# Patient Record
Sex: FEMALE | Race: WHITE | Employment: PART TIME | URBAN - METROPOLITAN AREA
[De-identification: names, ages, dates, MRNs, and addresses within clinical notes are randomized per-mention and may not be internally consistent; named-entity substitution may affect disease eponyms.]

---

## 2017-12-21 ENCOUNTER — OFFICE VISIT (OUTPATIENT)
Dept: FAMILY MEDICINE CLINIC | Age: 57
End: 2017-12-21

## 2017-12-21 VITALS
TEMPERATURE: 99.1 F | BODY MASS INDEX: 29.92 KG/M2 | HEIGHT: 65 IN | SYSTOLIC BLOOD PRESSURE: 104 MMHG | RESPIRATION RATE: 16 BRPM | HEART RATE: 84 BPM | DIASTOLIC BLOOD PRESSURE: 73 MMHG | OXYGEN SATURATION: 97 % | WEIGHT: 179.6 LBS

## 2017-12-21 DIAGNOSIS — J40 BRONCHITIS: Primary | ICD-10-CM

## 2017-12-21 RX ORDER — DOXYCYCLINE 100 MG/1
100 CAPSULE ORAL 2 TIMES DAILY
Qty: 20 CAP | Refills: 0 | Status: SHIPPED | OUTPATIENT
Start: 2017-12-21 | End: 2017-12-23 | Stop reason: ALTCHOICE

## 2017-12-21 NOTE — PROGRESS NOTES
HPI/ROS  Patient complains of bilateral ear pressure/pain. Symptoms include congestion, headache described as frontal, lightheadedness, low grade fever, post nasal drip, productive cough with  yellow colored sputum, sinus pressure, tooth pain and vertigo. Onset of symptoms was 5 days ago, gradually worsening since that time. Patient is drinking plenty of fluids. .  Past history is significant for no history of pneumonia or bronchitis. Patient is non-smoker. She is using motrin x 1 for fever 1 hour ago, tmax 100.1. Body aches have started back last night. Visit Vitals    /73 (BP 1 Location: Right arm, BP Patient Position: Sitting)    Pulse 84    Temp 99.1 °F (37.3 °C) (Oral)    Resp 16    Ht 5' 5\" (1.651 m)    Wt 179 lb 9.6 oz (81.5 kg)    SpO2 97%    BMI 29.89 kg/m2     Physical Examination:   GENERAL ASSESSMENT: well developed and well nourished  SKIN: normal color, no lesions  HEAD: normocephalic  EYES: normal eyes  EARS: external auditory canal: clear and tympanic membrane: yellow, bulging  NOSE: normal external appearance and nares patent  MOUTH: yellow exudates of OP  NECK: normal  CHEST: normal air exchange, no rales, right upper lobe rhonchi, no wheezes, respiratory effort normal with no retractions  HEART: regular rate and rhythm, normal S1/S2, no murmurs  ABDOMEN:  not examined  EXTREMITY: not examined  NEURO: not examined    Diagnoses and all orders for this visit:    1. Bronchitis    Other orders  -     doxycycline (VIBRAMYCIN) 100 mg capsule; Take 1 Cap by mouth two (2) times a day for 10 days. Reveiwed adr/se of medication  Push fluids, rest, suggested mucinexD for congestion and drainage  Recheck 5-7 days if sx not improved. I have discussed the diagnosis with the patient and the intended plan as seen in the above orders. The patient has received an after-visit summary and questions were answered concerning future plans.  Patient conveyed understanding of the plan at the time of the visit.     Malinda Frye, MSN, ANP  12/21/2017

## 2017-12-21 NOTE — MR AVS SNAPSHOT
Visit Information Date & Time Provider Department Dept. Phone Encounter #  
 12/21/2017  2:15 PM Candi Carey, [address] [phone] [id number] Upcoming Health Maintenance Date Due DTaP/Tdap/Td series (1 - Tdap) 4/28/1981 PAP AKA CERVICAL CYTOLOGY 4/28/1981 FOBT Q 1 YEAR AGE 50-75 4/28/2010 Allergies as of 12/21/2017  Review Complete On: 12/21/2017 By: Cynthia Grubbs LPN Severity Noted Reaction Type Reactions Keflex [Cephalexin]  07/09/2015    Rash Other Medication  07/09/2015    Contact Dermatitis PPD serum Current Immunizations  Reviewed on 10/30/2011 Name Date Influenza Vaccine Split 10/30/2011 Not reviewed this visit You Were Diagnosed With   
  
 Codes Comments Bronchitis    -  Primary ICD-10-CM: U70 ICD-9-CM: 004 Vitals BP Pulse Temp Resp Height(growth percentile) Weight(growth percentile) 104/73 (BP 1 Location: Right arm, BP Patient Position: Sitting) 84 99.1 °F (37.3 °C) (Oral) 16 5' 5\" (1.651 m) 179 lb 9.6 oz (81.5 kg) SpO2 BMI OB Status Smoking Status 97% 29.89 kg/m2 Hysterectomy Never Smoker Vitals History BMI and BSA Data Body Mass Index Body Surface Area  
 29.89 kg/m 2 1.93 m 2 Preferred Pharmacy Pharmacy Name Phone [facility] [place] [facility] [phone] Your Updated Medication List  
  
   
This list is accurate as of: 12/21/17  2:39 PM.  Always use your most recent med list.  
  
  
  
  
 doxycycline 100 mg capsule Commonly known as:  VIBRAMYCIN Take 1 Cap by mouth two (2) times a day for 10 days. multivitamin, tx-iron-ca-min 9 mg iron-400 mcg Tab tablet Commonly known as:  THERA-M w/ IRON Take 1 Tab by mouth daily. Prescriptions Sent to Pharmacy  Refills  
 doxycycline (VIBRAMYCIN) 100 mg capsule 0  
 Sig: Take 1 Cap by mouth two (2) times a day for 10 days. Class: Normal  
 Pharmacy: [address] [place] [address] #: [phone] Route: Oral  
  
Introducing [facility]! Dear Mikhail La: Thank you for requesting a Spool account. Our records indicate that you already have an active Spool account. You can access your account anytime at [email or web address] Did you know that you can access your hospital and ER discharge instructions at any time in Spool? You can also review all of your test results from your hospital stay or ER visit. Additional Information If you have questions, please visit the Frequently Asked Questions section of the Spool website at [email or web address] Remember, Spool is NOT to be used for urgent needs. For medical emergencies, dial 911. Now available from your iPhone and Android! Please provide this summary of care documentation to your next provider. Your primary care clinician is listed as Yo. If you have any questions after today's visit, please call [phone].

## 2017-12-21 NOTE — PROGRESS NOTES
Chief Complaint   Patient presents with    Cough     Started last Wednesday with scratchy throat and cough, fever and productive cough over the weekend, then woke up today with fever, cough and achy

## 2017-12-22 ENCOUNTER — OFFICE VISIT (OUTPATIENT)
Dept: FAMILY MEDICINE CLINIC | Age: 57
End: 2017-12-22

## 2017-12-22 VITALS
RESPIRATION RATE: 17 BRPM | TEMPERATURE: 101.3 F | DIASTOLIC BLOOD PRESSURE: 80 MMHG | HEART RATE: 90 BPM | SYSTOLIC BLOOD PRESSURE: 132 MMHG | OXYGEN SATURATION: 95 % | HEIGHT: 65 IN

## 2017-12-22 DIAGNOSIS — R05.9 COUGH: ICD-10-CM

## 2017-12-22 DIAGNOSIS — B34.9 VIRAL SYNDROME: Primary | ICD-10-CM

## 2017-12-22 LAB
QUICKVUE INFLUENZA TEST: NEGATIVE
VALID INTERNAL CONTROL?: YES

## 2017-12-22 RX ORDER — OSELTAMIVIR PHOSPHATE 75 MG/1
75 CAPSULE ORAL 2 TIMES DAILY
Qty: 10 CAP | Refills: 0 | Status: SHIPPED | OUTPATIENT
Start: 2017-12-22 | End: 2017-12-27

## 2017-12-22 NOTE — PROGRESS NOTES
Chief Complaint   Patient presents with    Fever    Generalized Body Aches   pt c/o fever and body aches. She states she has been taken her abx doxycycline as prescribed and has been taking Motrin to help with discomfort. This note will not be viewable in [address]

## 2017-12-23 ENCOUNTER — OFFICE VISIT (OUTPATIENT)
Dept: PRIMARY CARE CLINIC | Age: 57
End: 2017-12-23

## 2017-12-23 VITALS
WEIGHT: 177.4 LBS | DIASTOLIC BLOOD PRESSURE: 74 MMHG | TEMPERATURE: 99.5 F | HEART RATE: 103 BPM | BODY MASS INDEX: 29.56 KG/M2 | SYSTOLIC BLOOD PRESSURE: 124 MMHG | HEIGHT: 65 IN | RESPIRATION RATE: 18 BRPM | OXYGEN SATURATION: 98 %

## 2017-12-23 DIAGNOSIS — J02.9 SORE THROAT: ICD-10-CM

## 2017-12-23 DIAGNOSIS — J02.9 ACUTE PHARYNGITIS, UNSPECIFIED ETIOLOGY: Primary | ICD-10-CM

## 2017-12-23 LAB
MONONUCLEOSIS SCREEN POC: NEGATIVE
S PYO AG THROAT QL: NEGATIVE
VALID INTERNAL CONTROL?: YES
VALID INTERNAL CONTROL?: YES

## 2017-12-23 RX ORDER — AZITHROMYCIN 250 MG/1
TABLET, FILM COATED ORAL
Qty: 6 TAB | Refills: 0 | Status: SHIPPED | OUTPATIENT
Start: 2017-12-23 | End: 2018-03-10 | Stop reason: ALTCHOICE

## 2017-12-23 NOTE — MR AVS SNAPSHOT
Visit Information Date & Time Provider Department Dept. Phone Encounter #  
 12/23/2017 10:00 AM Gomez Umanzor Gilberto Berenice [id number] Upcoming Health Maintenance Date Due DTaP/Tdap/Td series (1 - Tdap) 4/28/1981 PAP AKA CERVICAL CYTOLOGY 4/28/1981 FOBT Q 1 YEAR AGE 50-75 4/28/2010 Allergies as of 12/23/2017  Review Complete On: 12/23/2017 By: Gomez Umanzor MD  
  
 Severity Noted Reaction Type Reactions Keflex [Cephalexin]  07/09/2015    Rash Other Medication  07/09/2015    Contact Dermatitis PPD serum Current Immunizations  Reviewed on 10/30/2011 Name Date Influenza Vaccine Split 10/30/2011 Not reviewed this visit You Were Diagnosed With   
  
 Codes Comments Acute pharyngitis, unspecified etiology    -  Primary ICD-10-CM: J02.9 ICD-9-CM: 281 Sore throat     ICD-10-CM: J02.9 ICD-9-CM: 283 Vitals BP Pulse Temp Resp Height(growth percentile) Weight(growth percentile) 124/74 (BP 1 Location: Left arm, BP Patient Position: Sitting) (!) 103 99.5 °F (37.5 °C) (Oral) 18 5' 5\" (1.651 m) 177 lb 6.4 oz (80.5 kg) SpO2 BMI OB Status Smoking Status 98% 29.52 kg/m2 Hysterectomy Never Smoker BMI and BSA Data Body Mass Index Body Surface Area  
 29.52 kg/m 2 1.92 m 2 Preferred Pharmacy Pharmacy Name Phone [address] [phone] Your Updated Medication List  
  
   
This list is accurate as of: 12/23/17 10:40 AM.  Always use your most recent med list.  
  
  
  
  
 azithromycin 250 mg tablet Commonly known as:  Bonney Fine Take 2 tablets today, then take 1 tablet daily  
  
 multivitamin, tx-iron-ca-min 9 mg iron-400 mcg Tab tablet Commonly known as:  THERA-M w/ IRON Take 1 Tab by mouth daily. oseltamivir 75 mg capsule Commonly known as:  TAMIFLU Take 1 Cap by mouth two (2) times a day for 5 days. Prescriptions Sent to Pharmacy Refills  
 azithromycin (ZITHROMAX) 250 mg tablet 0 Sig: Take 2 tablets today, then take 1 tablet daily Class: Normal  
 Pharmacy: [address] [place] [address] #: [phone] We Performed the Following AMB POC RAPID STREP A [50801 CPT(R)] POC HETEROPHILE ANTIBODY SCREEN [24359 CPT(R)] UPPER RESPIRATORY CULTURE [id number] CPT(R)] Patient Instructions Sore Throat: Care Instructions Your Care Instructions Infection by bacteria or a virus causes most sore throats. Cigarette smoke, dry air, air pollution, allergies, and yelling can also cause a sore throat. Sore throats can be painful and annoying. Fortunately, most sore throats go away on their own. If you have a bacterial infection, your doctor may prescribe antibiotics. Follow-up care is a key part of your treatment and safety. Be sure to make and go to all appointments, and call your doctor if you are having problems. It's also a good idea to know your test results and keep a list of the medicines you take. How can you care for yourself at home? · If your doctor prescribed antibiotics, take them as directed. Do not stop taking them just because you feel better. You need to take the full course of antibiotics. · Gargle with warm salt water once an hour to help reduce swelling and relieve discomfort. Use 1 teaspoon of salt mixed in 1 cup of warm water. · Take an over-the-counter pain medicine, such as acetaminophen (Tylenol), ibuprofen (Advil, Motrin), or naproxen (Aleve). Read and follow all instructions on the label. · Be careful when taking over-the-counter cold or flu medicines and Tylenol at the same time. Many of these medicines have acetaminophen, which is Tylenol. Read the labels to make sure that you are not taking more than the recommended dose. Too much acetaminophen (Tylenol) can be harmful. · Drink plenty of fluids. Fluids may help soothe an irritated throat. Hot fluids, such as tea or soup, may help decrease throat pain. · Use over-the-counter throat lozenges to soothe pain. Regular cough drops or hard candy may also help. These should not be given to young children because of the risk of choking. · Do not smoke or allow others to smoke around you. If you need help quitting, talk to your doctor about stop-smoking programs and medicines. These can increase your chances of quitting for good. · Use a vaporizer or humidifier to add moisture to your bedroom. Follow the directions for cleaning the machine. When should you call for help? Call your doctor now or seek immediate medical care if: 
? · You have new or worse trouble swallowing. ? · Your sore throat gets much worse on one side. ? Watch closely for changes in your health, and be sure to contact your doctor if you do not get better as expected. Where can you learn more? Go to http://angeles-leonor.info/. Enter [id number] in the search box to learn more about \"Sore Throat: Care Instructions. \" Current as of: May 12, 2017 Content Version: 11.4 © 9221-6231 Healthwise, AngioSlide. Care instructions adapted under license by [employer] (which disclaims liability or warranty for this information). If you have questions about a medical condition or this instruction, always ask your healthcare professional. [place] [address] any warranty or liability for your use of this information. Introducing [facility]! Dear Yvette James: Thank you for requesting a NXE account. Our records indicate that you already have an active NXE account. You can access your account anytime at [email or web address] Did you know that you can access your hospital and ER discharge instructions at any time in NXE?   You can also review all of your test results from your hospital stay or ER visit. Additional Information If you have questions, please visit the Frequently Asked Questions section of the ThoughtLeadr website at [email or web address] Remember, ThoughtLeadr is NOT to be used for urgent needs. For medical emergencies, dial 911. Now available from your iPhone and Android! Please provide this summary of care documentation to your next provider. Your primary care clinician is listed as Yo. If you have any questions after today's visit, please call [phone].

## 2017-12-23 NOTE — PROGRESS NOTES
Chief Complaint   Patient presents with    Generalized Body Aches    Sore Throat   pt c/o continued body aches and states throat is tender on L side, states she has been taking tamiflu and doxycycline as directed. last dose of motrin was at midnight. This note will not be viewable in [address]

## 2017-12-23 NOTE — PATIENT INSTRUCTIONS
Sore Throat: Care Instructions  Your Care Instructions    Infection by bacteria or a virus causes most sore throats. Cigarette smoke, dry air, air pollution, allergies, and yelling can also cause a sore throat. Sore throats can be painful and annoying. Fortunately, most sore throats go away on their own. If you have a bacterial infection, your doctor may prescribe antibiotics. Follow-up care is a key part of your treatment and safety. Be sure to make and go to all appointments, and call your doctor if you are having problems. It's also a good idea to know your test results and keep a list of the medicines you take. How can you care for yourself at home? · If your doctor prescribed antibiotics, take them as directed. Do not stop taking them just because you feel better. You need to take the full course of antibiotics. · Gargle with warm salt water once an hour to help reduce swelling and relieve discomfort. Use 1 teaspoon of salt mixed in 1 cup of warm water. · Take an over-the-counter pain medicine, such as acetaminophen (Tylenol), ibuprofen (Advil, Motrin), or naproxen (Aleve). Read and follow all instructions on the label. · Be careful when taking over-the-counter cold or flu medicines and Tylenol at the same time. Many of these medicines have acetaminophen, which is Tylenol. Read the labels to make sure that you are not taking more than the recommended dose. Too much acetaminophen (Tylenol) can be harmful. · Drink plenty of fluids. Fluids may help soothe an irritated throat. Hot fluids, such as tea or soup, may help decrease throat pain. · Use over-the-counter throat lozenges to soothe pain. Regular cough drops or hard candy may also help. These should not be given to young children because of the risk of choking. · Do not smoke or allow others to smoke around you. If you need help quitting, talk to your doctor about stop-smoking programs and medicines.  These can increase your chances of quitting for good. · Use a vaporizer or humidifier to add moisture to your bedroom. Follow the directions for cleaning the machine. When should you call for help? Call your doctor now or seek immediate medical care if:  ? · You have new or worse trouble swallowing. ? · Your sore throat gets much worse on one side. ? Watch closely for changes in your health, and be sure to contact your doctor if you do not get better as expected. Where can you learn more? Go to http://angeles-leonor.info/. Enter [id number] in the search box to learn more about \"Sore Throat: Care Instructions. \"  Current as of: May 12, 2017  Content Version: 11.4  © 1630-9162 Healthwise, Incorporated. Care instructions adapted under license by [employer] (which disclaims liability or warranty for this information). If you have questions about a medical condition or this instruction, always ask your healthcare professional. [address] any warranty or liability for your use of this information.

## 2017-12-23 NOTE — PROGRESS NOTES
HISTORY OF PRESENT ILLNESS  Jolynn Boston is a 62 y.o. female. HPI  Presents for continued symptoms of fever and fatigue. States symptoms started with sore throat since 12/14 that developed into cough and fever later that week, felt initially better but 3 days ago felt worse and was seen in clinic on 12/21, started on doxycycline for bronchitis, seen again yesterday for worsening symptoms and fever had negative flu but started on tamiflu. She is currently on doxycycline day 3. States cough is slightly better however left throat hurts today and she still feels fatigued and chills and temp 101 at home last night. She has tried flonase, mucinex, motrin last night. She has received flu vaccine. Denies sick contacts. Review of Systems   Constitutional: Positive for chills, fever and malaise/fatigue. HENT: Positive for sore throat. Negative for congestion, ear pain and sinus pain. Eyes: Negative for pain and redness. Respiratory: Positive for cough. Negative for sputum production, shortness of breath and stridor. Cardiovascular: Negative for chest pain and palpitations. Gastrointestinal: Negative for abdominal pain, diarrhea and nausea. Genitourinary: Negative for dysuria, frequency and urgency. Musculoskeletal: Negative for back pain, falls and neck pain. Skin: Negative for rash. Neurological: Negative for tingling, sensory change, focal weakness, weakness and headaches. History reviewed. No pertinent past medical history.   Past Surgical History:   Procedure Laterality Date    HX CHOLECYSTECTOMY  1997    HX GYN  2009    VICKI     Social History     Social History    Marital status:      Spouse name: N/A    Number of children: N/A    Years of education: N/A     Social History Main Topics    Smoking status: Never Smoker    Smokeless tobacco: Never Used    Alcohol use No    Drug use: None    Sexual activity: Yes     Birth control/ protection: Surgical     Other Topics Concern    None     Social History Narrative     History reviewed. No pertinent family history. Current Outpatient Prescriptions on File Prior to Visit   Medication Sig Dispense Refill    oseltamivir (TAMIFLU) 75 mg capsule Take 1 Cap by mouth two (2) times a day for 5 days. 10 Cap 0    multivitamin, tx-iron-ca-min (THERA-M W/ IRON) 9 mg iron-400 mcg tab tablet Take 1 Tab by mouth daily. No current facility-administered medications on file prior to visit. Allergies   Allergen Reactions    Keflex [Cephalexin] Rash    Other Medication Contact Dermatitis     PPD serum       Physical Exam   Constitutional: She is oriented to person, place, and time. She appears well-developed and well-nourished. No distress. /74 (BP 1 Location: Left arm, BP Patient Position: Sitting)  Pulse (!) 103  Temp 99.5 °F (37.5 °C) (Oral)   Resp 18  Ht 5' 5\" (1.651 m)  Wt 177 lb 6.4 oz (80.5 kg)  SpO2 98%  BMI 29.52 kg/m2   HENT:   Head: Normocephalic and atraumatic. Right Ear: Tympanic membrane normal.   Left Ear: Tympanic membrane normal.   Nose: Nose normal. No mucosal edema. Mouth/Throat: Oropharyngeal exudate, posterior oropharyngeal edema and posterior oropharyngeal erythema present. Eyes: Conjunctivae and EOM are normal. Pupils are equal, round, and reactive to light. No scleral icterus. Neck: Normal range of motion. Neck supple. Cardiovascular: Normal rate, regular rhythm, normal heart sounds and intact distal pulses. No murmur heard. Pulmonary/Chest: Effort normal and breath sounds normal. No stridor. No respiratory distress. She has no wheezes. Abdominal: Soft. Bowel sounds are normal. She exhibits no distension. There is no tenderness. There is no rebound and no guarding. Musculoskeletal: Normal range of motion. She exhibits no edema or tenderness. Lymphadenopathy:     She has cervical adenopathy. Neurological: She is alert and oriented to person, place, and time. No cranial nerve deficit. Skin: Skin is warm and dry. No rash noted. Psychiatric: She has a normal mood and affect. Her behavior is normal.   Nursing note and vitals reviewed. Rapid strep negative. Monospot negative. ASSESSMENT and PLAN    ICD-10-CM ICD-9-CM    1. Acute pharyngitis, unspecified etiology J02.9 462 UPPER RESPIRATORY CULTURE      azithromycin (ZITHROMAX) 250 mg tablet   2. Sore throat J02.9 462 AMB POC RAPID STREP A      POC HETEROPHILE ANTIBODY SCREEN      UPPER RESPIRATORY CULTURE     Patient currently on doxycycline for presumed bacterial bronchitis and tamiflu despite negative rapi flu yesterday, cough is better however she is still febrile (though down-trending from tomorrow) and with tonsillar erythema and exudate on exam however negative strep and monospot today. Given improvement in cough will forgo CXR for now as it won't . Again fever is slightly better but with being on doxy day 3 now would've expected more significant improvement. Will switch doxycycline to azithromycin which will provide strep coverage and send throat culture. Azithromycin will still provide coverage for bronchitis/walking PNA. Advised to continue to monitor fever curve at home, return to clinic if not improving or worsening. This note will not be viewable in [address]

## 2017-12-24 ENCOUNTER — HOSPITAL ENCOUNTER (EMERGENCY)
Age: 57
Discharge: STILL ADMITTED OR ADMINISTRATIVE | End: 2017-12-24
Attending: EMERGENCY MEDICINE
Payer: COMMERCIAL

## 2017-12-24 ENCOUNTER — HOSPITAL ENCOUNTER (EMERGENCY)
Age: 57
Discharge: HOME | End: 2017-12-24
Attending: EMERGENCY MEDICINE
Payer: COMMERCIAL

## 2017-12-24 VITALS
HEART RATE: 74 BPM | TEMPERATURE: 99.8 F | SYSTOLIC BLOOD PRESSURE: 100 MMHG | DIASTOLIC BLOOD PRESSURE: 70 MMHG | WEIGHT: 175 LBS | BODY MASS INDEX: 28.12 KG/M2 | OXYGEN SATURATION: 98 % | RESPIRATION RATE: 16 BRPM | HEIGHT: 66 IN

## 2017-12-24 DIAGNOSIS — B34.9 VIRAL SYNDROME: ICD-10-CM

## 2017-12-24 DIAGNOSIS — R50.9 FEVER, UNSPECIFIED FEVER CAUSE: Primary | ICD-10-CM

## 2017-12-24 LAB
ANION GAP SERPL CALC-SCNC: 11 MMOL/L (ref 5–15)
BASOPHILS # BLD: 0 K/UL (ref 0–0.1)
BASOPHILS NFR BLD: 0 % (ref 0–1)
BUN SERPL-MCNC: 13 MG/DL (ref 6–20)
BUN/CREAT SERPL: 16 (ref 12–20)
CALCIUM SERPL-MCNC: 8.7 MG/DL (ref 8.5–10.1)
CHLORIDE SERPL-SCNC: 101 MMOL/L (ref 97–108)
CO2 SERPL-SCNC: 26 MMOL/L (ref 21–32)
CREAT SERPL-MCNC: 0.8 MG/DL (ref 0.55–1.02)
EOSINOPHIL # BLD: 0 K/UL (ref 0–0.4)
EOSINOPHIL NFR BLD: 1 % (ref 0–7)
ERYTHROCYTE [DISTWIDTH] IN BLOOD BY AUTOMATED COUNT: 13.4 % (ref 11.5–14.5)
GLUCOSE SERPL-MCNC: 99 MG/DL (ref 65–100)
HCT VFR BLD AUTO: 34 % (ref 35–47)
HGB BLD-MCNC: 11.5 G/DL (ref 11.5–16)
LACTATE SERPL-SCNC: 0.6 MMOL/L (ref 0.4–2)
LYMPHOCYTES # BLD: 1 K/UL (ref 0.8–3.5)
LYMPHOCYTES NFR BLD: 12 % (ref 12–49)
MCH RBC QN AUTO: 28 PG (ref 26–34)
MCHC RBC AUTO-ENTMCNC: 33.8 G/DL (ref 30–36.5)
MCV RBC AUTO: 82.7 FL (ref 80–99)
MONOCYTES # BLD: 0.6 K/UL (ref 0–1)
MONOCYTES NFR BLD: 7 % (ref 5–13)
NEUTS SEG # BLD: 6.3 K/UL (ref 1.8–8)
NEUTS SEG NFR BLD: 80 % (ref 32–75)
PLATELET # BLD AUTO: 248 K/UL (ref 150–400)
POTASSIUM SERPL-SCNC: 3.3 MMOL/L (ref 3.5–5.1)
RBC # BLD AUTO: 4.11 M/UL (ref 3.8–5.2)
SODIUM SERPL-SCNC: 138 MMOL/L (ref 136–145)
WBC # BLD AUTO: 7.9 K/UL (ref 3.6–11)

## 2017-12-24 PROCEDURE — 36415 COLL VENOUS BLD VENIPUNCTURE: CPT | Performed by: EMERGENCY MEDICINE

## 2017-12-24 PROCEDURE — 99283 EMERGENCY DEPT VISIT LOW MDM: CPT

## 2017-12-24 PROCEDURE — 80048 BASIC METABOLIC PNL TOTAL CA: CPT | Performed by: EMERGENCY MEDICINE

## 2017-12-24 PROCEDURE — 85025 COMPLETE CBC W/AUTO DIFF WBC: CPT | Performed by: EMERGENCY MEDICINE

## 2017-12-24 PROCEDURE — 75810000275 HC EMERGENCY DEPT VISIT NO LEVEL OF CARE

## 2017-12-24 PROCEDURE — 74011250637 HC RX REV CODE- 250/637: Performed by: EMERGENCY MEDICINE

## 2017-12-24 PROCEDURE — 83605 ASSAY OF LACTIC ACID: CPT | Performed by: EMERGENCY MEDICINE

## 2017-12-24 RX ORDER — IBUPROFEN 600 MG/1
600 TABLET ORAL
Qty: 30 TAB | Refills: 0 | Status: SHIPPED | OUTPATIENT
Start: 2017-12-24 | End: 2018-03-10 | Stop reason: ALTCHOICE

## 2017-12-24 RX ORDER — ACETAMINOPHEN 500 MG
1000 TABLET ORAL
Qty: 60 TAB | Refills: 0 | Status: SHIPPED | OUTPATIENT
Start: 2017-12-24 | End: 2018-03-10 | Stop reason: ALTCHOICE

## 2017-12-24 RX ORDER — ACETAMINOPHEN 500 MG
1000 TABLET ORAL
Status: COMPLETED | OUTPATIENT
Start: 2017-12-24 | End: 2017-12-24

## 2017-12-24 RX ADMIN — ACETAMINOPHEN 1000 MG: 500 TABLET ORAL at 20:12

## 2017-12-25 NOTE — DISCHARGE INSTRUCTIONS
Learning About Fever  What is a fever? A fever is a high body temperature. It's one way your body fights being sick. A fever shows that the body is responding to infection or other illnesses, both minor and severe. A fever is a symptom, not an illness by itself. A fever can be a sign that you are ill, but most fevers are not caused by a serious problem. You may have a fever with a minor illness, such as a cold. But sometimes a very serious infection may cause little or no fever. It is important to look at other symptoms, other conditions you have, and how you feel in general. In children, notice how they act and see what symptoms they complain of. What is a normal body temperature? A normal body temperature is about 98. 6ºF. Some people have a normal temperature that is a little higher or a little lower than this. Your temperature may be a little lower in the morning than it is later in the day. It may go up during hot weather or when you exercise, wear heavy clothes, or take a hot bath. Your temperature may also be different depending on how you take it. A temperature taken in the mouth (oral) or under the arm may be a little lower than your core temperature (rectal). What is a fever temperature? A core temperature of 100.4°F or above is considered a fever. What can cause a fever? A fever may be caused by:  · Infections. This is the most common cause of a fever. Examples of infections that can cause a fever include the flu, a kidney infection, or pneumonia. · Some medicines. · Severe trauma or injury, such as a heart attack, stroke, heatstroke, or burns. · Other medical conditions, such as arthritis and some cancers. How can you treat a fever at home? · Ask your doctor if you can take an over-the-counter pain medicine, such as acetaminophen (Tylenol), ibuprofen (Advil, Motrin), or naproxen (Aleve). Be safe with medicines. Read and follow all instructions on the label.   · To prevent dehydration, drink plenty of fluids. Choose water and other caffeine-free clear liquids until you feel better. If you have kidney, heart, or liver disease and have to limit fluids, talk with your doctor before you increase the amount of fluids you drink. Follow-up care is a key part of your treatment and safety. Be sure to make and go to all appointments, and call your doctor if you are having problems. It's also a good idea to know your test results and keep a list of the medicines you take. Where can you learn more? Go to http://angeles-leonor.info/. Enter V198 in the search box to learn more about \"Learning About Fever. \"  Current as of: March 20, 2017  Content Version: 11.4  © 6610-7594 Healthwise, Incorporated. Care instructions adapted under license by uberlife (which disclaims liability or warranty for this information). If you have questions about a medical condition or this instruction, always ask your healthcare professional. [address] any warranty or liability for your use of this information.

## 2017-12-25 NOTE — ED TRIAGE NOTES
Pt reports persistent fever since 12/21. Given doxycycline at that time for possible bronchitis. Flu negative on 12/22 and strep and mono were negative 12/23. Here for continued fever.

## 2017-12-25 NOTE — ED PROVIDER NOTES
HPI Comments: 62 y.o. female with no significant past medical history who presents from home with chief complaint of fever. Patient states that she had a low grade fever with a sore throat and cough on 12/13 (11 days ago). This past Thursday (3 days ago), patient started to have onset of body aches and an increased, persistent fever of 103F. She was seen by the [address] [place] [address] and received doxycycline after the nurse practitioner heard some rhonchi in her lungs. Patient continued to have a high fever and body aches and went back to [facility], and received negative strep test, mono test, an flu test. However, patient was still placed on Tamiflu due to her flu-like symptoms, and was also changed to azithromycin from her doxycycline. Patient has been taking her azithromycin for 2 days now. She reports having an oral temp of 104.2F approximately 2 hours ago. Patient reports taking 200 mg Motrin around that time. She denies having any nausea, vomiting, diarrhea, abdominal pain, or sore throat. There are no other acute medical concerns at this time. Social hx: nonsmoker, no EtOH use   PCP: Karli Alberto MD    Note written by Tamera Mejía, as dictated by Tyrone Senior. Berna Lemus MD 8:01 PM      The history is provided by the patient. No  was used. History reviewed. No pertinent past medical history. Past Surgical History:   Procedure Laterality Date    HX CHOLECYSTECTOMY  1997    HX GYN  2009    VICKI         History reviewed. No pertinent family history. Social History     Social History    Marital status:      Spouse name: N/A    Number of children: N/A    Years of education: N/A     Occupational History    Not on file.      Social History Main Topics    Smoking status: Never Smoker    Smokeless tobacco: Never Used    Alcohol use No    Drug use: Not on file    Sexual activity: Yes     Birth control/ protection: Surgical     Other Topics Concern    Not on file     Social History Narrative         ALLERGIES: Keflex [cephalexin] and Other medication    Review of Systems   Constitutional: Positive for fever. Negative for chills. HENT: Negative for ear pain and sore throat. Eyes: Negative for pain. Respiratory: Negative for chest tightness and shortness of breath. Cardiovascular: Negative for chest pain and leg swelling. Gastrointestinal: Negative for abdominal pain, nausea and vomiting. Genitourinary: Negative for dysuria and flank pain. Musculoskeletal: Positive for myalgias. Negative for back pain. Skin: Negative for rash. Neurological: Negative for headaches. All other systems reviewed and are negative. Vitals:    12/24/17 1937   BP: 121/77   Pulse: 100   Resp: 18   Temp: (!) 102 °F (38.9 °C)   SpO2: 95%   Weight: 79.4 kg (175 lb)   Height: 5' 6\" (1.676 m)            Physical Exam   Constitutional: She appears well-developed and well-nourished. HENT:   Head: Normocephalic and atraumatic. Mouth/Throat: Oropharynx is clear and moist.   Eyes: Conjunctivae and EOM are normal. Pupils are equal, round, and reactive to light. No scleral icterus. Neck: Neck supple. No tracheal deviation present. Cardiovascular: Normal rate, regular rhythm, normal heart sounds and intact distal pulses. Pulmonary/Chest: Effort normal and breath sounds normal. No respiratory distress. Abdominal: Soft. She exhibits no distension. There is no tenderness. There is no rebound and no guarding. Genitourinary:   Genitourinary Comments: deferred   Musculoskeletal: She exhibits no edema. Neurological: She is alert. Skin: Skin is warm. clammy   Psychiatric: She has a normal mood and affect. Nursing note and vitals reviewed. Note written by Tamera Presley, as dictated by Sujatha Hernandez.  Maverick Seaman MD 8:01 PM    MDM  Number of Diagnoses or Management Options  Fever, unspecified fever cause:   Viral syndrome:   Diagnosis management comments: 61 yo female p/w fever for past few days. On tamiflu and azithromycin . Fever has been persistent but has only been taking 200 mg of Motrin.     Plan: ibuprofen/ tylenol prn, f/u with PCP, Return to the emergency department for new/ worsening symptoms or other concerns           ED Course       Procedures

## 2017-12-26 ENCOUNTER — PATIENT OUTREACH (OUTPATIENT)
Dept: OTHER | Age: 57
End: 2017-12-26

## 2017-12-26 NOTE — PROGRESS NOTES
Transition Of Care Note    Patient discharged from [facility] on  for Fever. Medical History:   No past medical history on file. Care Manager contacted the patient by telephone to perform post ED discharge assessment. Verified  and zip code with patient as identifiers. Provided introduction to self, and explanation of the Nurse Care Manager role. Medication:   Discussed medication prescribed at [facility] and ED, and patient verbalizes understanding of administration of home medications. There were no barriers to obtaining medications identified at this time. Support system:  Patient        Discharge Instructions :  Reviewed discharge instructions with patient. Patient verbalizes understanding of discharge instructions and follow-up care. Advance Care Planning:   Patient was offered the opportunity to discuss advance care planning:  no     Does patient have an Advance Directive:  yes   If no, did you provide information on Caring Connections?  no     PCP/Specialist follow up: Patient scheduled to follow up with Ken Martin MD as needed. Reviewed red flags with patient, and patient verbalizes understanding. Patient given an opportunity to ask questions. No other clinical/social/functional needs noted. The patient agrees to contact the PCP office for questions related to their healthcare. The patient expressed thanks, offered no additional questions and ended the call.

## 2017-12-27 LAB — BACTERIA SPEC RESP CULT: NORMAL

## 2018-01-15 ENCOUNTER — PATIENT OUTREACH (OUTPATIENT)
Dept: OTHER | Age: 58
End: 2018-01-15

## 2018-01-15 NOTE — PROGRESS NOTES
Telephonic outreach to patient to follow up following a visit to the ED with high fever. Patient verified  and zip code. Patient stated that she is feeling better, her fever continued for 9 days following her visit to ED, however it was not as elevated. Patient stated that she will follow up with her PCP as needed. Will continue outreach to patient.

## 2018-01-21 NOTE — PROGRESS NOTES
HISTORY OF PRESENT ILLNESS  Elizabeth Lezama is a 62 y.o. female. HPI   This lady returns to the clinic in follow up. She was seen 12/21/17 and diagnosed with bronchitis and started on doxycycline. She returns today c/o she still has had some fever, generalized myalgia and chills. Has mild non productive cough  Eating and drinking OK    Review of Systems   Constitutional: Positive for chills and fever. Negative for malaise/fatigue and weight loss. HENT: Positive for congestion and sore throat. Respiratory: Positive for cough. Negative for sputum production and shortness of breath. Cardiovascular: Negative for chest pain. Musculoskeletal: Positive for myalgias. Physical Exam   Constitutional: She appears well-developed and well-nourished. No distress. HENT:   Right Ear: External ear normal.   Left Ear: External ear normal.   Nose: Nose normal.   Mouth/Throat: Oropharynx is clear and moist. No oropharyngeal exudate. Eyes: Pupils are equal, round, and reactive to light. Neck: Normal range of motion. Neck supple. Cardiovascular: Normal rate, regular rhythm and normal heart sounds. No murmur heard. Pulmonary/Chest: Effort normal and breath sounds normal. No respiratory distress. She has no wheezes. She has no rales. Abdominal: She exhibits no distension. Skin: She is not diaphoretic. ASSESSMENT and PLAN    ICD-10-CM ICD-9-CM    1. Viral syndrome B34.9 079.99    2. Cough R05 786.2 AMB POC RAPID INFLUENZA TEST   influenza activity rampant in the area. Will treat for presumed influenza. I have cautioned very short term follow up- withing the next 24 hours or sooner to the ed if worse  Pt verbalizes understanding.   Precautions given

## 2018-02-08 ENCOUNTER — PATIENT OUTREACH (OUTPATIENT)
Dept: OTHER | Age: 58
End: 2018-02-08

## 2018-02-08 NOTE — PROGRESS NOTES
Resolving current episode LANI (Transitions of care complete). No further ED/UC or hospital admissions within 30 days post discharge. Patient attended follow-up appointments as directed. No outreach from patient to [address] [place] [address]. Final call placed to patient, declines the need for further ECM at this time.

## 2018-03-10 ENCOUNTER — OFFICE VISIT (OUTPATIENT)
Dept: PRIMARY CARE CLINIC | Age: 58
End: 2018-03-10

## 2018-03-10 VITALS
HEIGHT: 66 IN | TEMPERATURE: 98.7 F | SYSTOLIC BLOOD PRESSURE: 107 MMHG | WEIGHT: 186.6 LBS | OXYGEN SATURATION: 96 % | DIASTOLIC BLOOD PRESSURE: 75 MMHG | BODY MASS INDEX: 29.99 KG/M2 | RESPIRATION RATE: 17 BRPM | HEART RATE: 83 BPM

## 2018-03-10 DIAGNOSIS — K52.9 GASTROENTERITIS: Primary | ICD-10-CM

## 2018-03-10 DIAGNOSIS — R50.9 FEVER, UNSPECIFIED FEVER CAUSE: ICD-10-CM

## 2018-03-10 LAB
BILIRUB UR QL STRIP: NEGATIVE
GLUCOSE UR-MCNC: NEGATIVE MG/DL
KETONES P FAST UR STRIP-MCNC: NEGATIVE MG/DL
PH UR STRIP: 7 [PH] (ref 4.6–8)
PROT UR QL STRIP: NEGATIVE
SP GR UR STRIP: 1.01 (ref 1–1.03)
UA UROBILINOGEN AMB POC: NORMAL (ref 0.2–1)
URINALYSIS CLARITY POC: CLEAR
URINALYSIS COLOR POC: NORMAL
URINE BLOOD POC: NEGATIVE
URINE LEUKOCYTES POC: NEGATIVE
URINE NITRITES POC: NEGATIVE

## 2018-03-10 RX ORDER — DOXYCYCLINE 100 MG/1
CAPSULE ORAL
Refills: 0 | COMMUNITY
Start: 2017-12-21 | End: 2018-03-10 | Stop reason: ALTCHOICE

## 2018-03-10 NOTE — PATIENT INSTRUCTIONS
Gastroenteritis: Care Instructions  Your Care Instructions    Gastroenteritis is an illness that may cause nausea, vomiting, and diarrhea. It is sometimes called \"stomach flu. \" It can be caused by bacteria or a virus. You will probably begin to feel better in 1 to 2 days. In the meantime, get plenty of rest and make sure you do not become dehydrated. Dehydration occurs when your body loses too much fluid. Follow-up care is a key part of your treatment and safety. Be sure to make and go to all appointments, and call your doctor if you are having problems. It's also a good idea to know your test results and keep a list of the medicines you take. How can you care for yourself at home? · If your doctor prescribed antibiotics, take them as directed. Do not stop taking them just because you feel better. You need to take the full course of antibiotics. · Drink plenty of fluids to prevent dehydration, enough so that your urine is light yellow or clear like water. Choose water and other caffeine-free clear liquids until you feel better. If you have kidney, heart, or liver disease and have to limit fluids, talk with your doctor before you increase your fluid intake. · Drink fluids slowly, in frequent, small amounts, because drinking too much too fast can cause vomiting. · Begin eating mild foods, such as dry toast, yogurt, applesauce, bananas, and rice. Avoid spicy, hot, or high-fat foods, and do not drink alcohol or caffeine for a day or two. Do not drink milk or eat ice cream until you are feeling better. How to prevent gastroenteritis  · Keep hot foods hot and cold foods cold. · Do not eat meats, dressings, salads, or other foods that have been kept at room temperature for more than 2 hours. · Use a thermometer to check your refrigerator. It should be between 34°F and 40°F.  · Defrost meats in the refrigerator or microwave, not on the kitchen counter. · Keep your hands and your kitchen clean.  Wash your hands, cutting boards, and countertops with hot soapy water frequently. · Cook meat until it is well done. · Do not eat raw eggs or uncooked sauces made with raw eggs. · Do not take chances. If food looks or tastes spoiled, throw it out. When should you call for help? Call 911 anytime you think you may need emergency care. For example, call if:  ? · You vomit blood or what looks like coffee grounds. ? · You passed out (lost consciousness). ? · You pass maroon or very bloody stools. ?Call your doctor now or seek immediate medical care if:  ? · You have severe belly pain. ? · You have signs of needing more fluids. You have sunken eyes, a dry mouth, and pass only a little dark urine. ? · You feel like you are going to faint. ? · You have increased belly pain that does not go away in 1 to 2 days. ? · You have new or increased nausea, or you are vomiting. ? · You have a new or higher fever. ? · Your stools are black and tarlike or have streaks of blood. ? Watch closely for changes in your health, and be sure to contact your doctor if:  ? · You are dizzy or lightheaded. ? · You urinate less than usual, or your urine is dark yellow or brown. ? · You do not feel better with each day that goes by. Where can you learn more? Go to http://angeles-leonor.info/. Enter N142 in the search box to learn more about \"Gastroenteritis: Care Instructions. \"  Current as of: March 3, 2017  Content Version: 11.4  © 7460-1328 [employer]. Care instructions adapted under license by [employer] (which disclaims liability or warranty for this information). If you have questions about a medical condition or this instruction, always ask your healthcare professional. [address] any warranty or liability for your use of this information.

## 2018-03-10 NOTE — PROGRESS NOTES
Pt here today c/o fever last night tmax 101.4, pt also c/o abdominal pressure that she noticed this morning,denies any urinary symptoms. This note will not be viewable in [address]

## 2018-03-10 NOTE — MR AVS SNAPSHOT
[address] 
[phone] Patient: Aden Wallace MRN: [id number] :1960 Visit Information Date & Time Provider Department Dept. Phone Encounter #  
 3/10/2018  9:45 AM Sandra Baires, [facility] [address] [facility] [id number] Follow-up Instructions Return if symptoms worsen or fail to improve. Upcoming Health Maintenance Date Due DTaP/Tdap/Td series (1 - Tdap) 1981 PAP AKA CERVICAL CYTOLOGY 1981 BREAST CANCER SCRN MAMMOGRAM 2010 FOBT Q 1 YEAR AGE 50-75 2010 Allergies as of 3/10/2018  Review Complete On: 3/10/2018 By: Frances Bowers LPN Severity Noted Reaction Type Reactions Keflex [Cephalexin]  2015    Rash Other Medication  2015    Contact Dermatitis PPD serum Current Immunizations  Reviewed on 10/30/2011 Name Date Influenza Vaccine Split 10/30/2011 Not reviewed this visit You Were Diagnosed With   
  
 Codes Comments Gastroenteritis    -  Primary ICD-10-CM: K52.9 ICD-9-CM: 558. 9 Fever, unspecified fever cause     ICD-10-CM: R50.9 ICD-9-CM: 780.60 Vitals BP Pulse Temp Resp Height(growth percentile) Weight(growth percentile) 107/75 (BP 1 Location: Left arm, BP Patient Position: Sitting) 83 98.7 °F (37.1 °C) (Oral) 17 5' 6\" (1.676 m) 186 lb 9.6 oz (84.6 kg) SpO2 BMI OB Status Smoking Status 96% 30.12 kg/m2 Hysterectomy Never Smoker BMI and BSA Data Body Mass Index Body Surface Area  
 30.12 kg/m 2 1.98 m 2 Preferred Pharmacy Pharmacy Name Phone [facility] [place] [phone] Your Updated Medication List  
  
   
This list is accurate as of 3/10/18 10:20 AM.  Always use your most recent med list.  
  
  
  
  
 multivitamin, tx-iron-ca-min 9 mg iron-400 mcg Tab tablet Commonly known as:  THERA-M w/ IRON Take 1 Tab by mouth daily. Follow-up Instructions Return if symptoms worsen or fail to improve. Patient Instructions Gastroenteritis: Care Instructions Your Care Instructions Gastroenteritis is an illness that may cause nausea, vomiting, and diarrhea. It is sometimes called \"stomach flu. \" It can be caused by bacteria or a virus. You will probably begin to feel better in 1 to 2 days. In the meantime, get plenty of rest and make sure you do not become dehydrated. Dehydration occurs when your body loses too much fluid. Follow-up care is a key part of your treatment and safety. Be sure to make and go to all appointments, and call your doctor if you are having problems. It's also a good idea to know your test results and keep a list of the medicines you take. How can you care for yourself at home? · If your doctor prescribed antibiotics, take them as directed. Do not stop taking them just because you feel better. You need to take the full course of antibiotics. · Drink plenty of fluids to prevent dehydration, enough so that your urine is light yellow or clear like water. Choose water and other caffeine-free clear liquids until you feel better. If you have kidney, heart, or liver disease and have to limit fluids, talk with your doctor before you increase your fluid intake. · Drink fluids slowly, in frequent, small amounts, because drinking too much too fast can cause vomiting. · Begin eating mild foods, such as dry toast, yogurt, applesauce, bananas, and rice. Avoid spicy, hot, or high-fat foods, and do not drink alcohol or caffeine for a day or two. Do not drink milk or eat ice cream until you are feeling better. How to prevent gastroenteritis · Keep hot foods hot and cold foods cold. · Do not eat meats, dressings, salads, or other foods that have been kept at room temperature for more than 2 hours. · Use a thermometer to check your refrigerator. It should be between 34°F and 40°F. 
· Defrost meats in the refrigerator or microwave, not on the kitchen counter. · Keep your hands and your kitchen clean. Wash your hands, cutting boards, and countertops with hot soapy water frequently. · Cook meat until it is well done. · Do not eat raw eggs or uncooked sauces made with raw eggs. · Do not take chances. If food looks or tastes spoiled, throw it out. When should you call for help? Call 911 anytime you think you may need emergency care. For example, call if: 
? · You vomit blood or what looks like coffee grounds. ? · You passed out (lost consciousness). ? · You pass maroon or very bloody stools. ?Call your doctor now or seek immediate medical care if: 
? · You have severe belly pain. ? · You have signs of needing more fluids. You have sunken eyes, a dry mouth, and pass only a little dark urine. ? · You feel like you are going to faint. ? · You have increased belly pain that does not go away in 1 to 2 days. ? · You have new or increased nausea, or you are vomiting. ? · You have a new or higher fever. ? · Your stools are black and tarlike or have streaks of blood. ? Watch closely for changes in your health, and be sure to contact your doctor if: 
? · You are dizzy or lightheaded. ? · You urinate less than usual, or your urine is dark yellow or brown. ? · You do not feel better with each day that goes by. Where can you learn more? Go to http://angeles-leonor.info/. Enter N142 in the search box to learn more about \"Gastroenteritis: Care Instructions. \" Current as of: March 3, 2017 Content Version: 11.4 © 4794-3001 [employer]. Care instructions adapted under license by [employer] (which disclaims liability or warranty for this information).  If you have questions about a medical condition or this instruction, always ask your healthcare professional. [address] any warranty or liability for your use of this information. Introducing [address]! Dear Duane Flicker: Thank you for requesting a PeeplePass account. Our records indicate that you already have an active PeeplePass account. You can access your account anytime at [email or web address] Did you know that you can access your hospital and ER discharge instructions at any time in PeeplePass? You can also review all of your test results from your hospital stay or ER visit. Additional Information If you have questions, please visit the Frequently Asked Questions section of the PeeplePass website at [email or web address] Remember, PeeplePass is NOT to be used for urgent needs. For medical emergencies, dial 911. Now available from your iPhone and Android! Please provide this summary of care documentation to your next provider. Your primary care clinician is listed as Yo. If you have any questions after today's visit, please call [phone].

## 2018-03-10 NOTE — PROGRESS NOTES
Subjective:      Patient : This patient is a 62 y.o. female with chief complaint of mild lower abdominal discomfort and fever of 101.4 last night. No fever this am. She was very concerned about having diverticulitis as she had 1 episode 3 years ago. The symptoms began several hours ago and have been unchanged. The symptoms were gradual  in onset. The patient states the stools have been soft. The stools are brown  The patient had denied vomitting and diarrhea. The patient has complaint of abdominal pain, The pain is described as  Very mild suprapubic pressure, located across the suprapubic area . She has not had change in diet. The patient has not tried OTCs for relief of nausea or diarrhea at home for his symptoms without relief. She rates her symtoms as mild. Objective:     ROS:   Feeling well. No dyspnea or chest pain on exertion. No abdominal pain, change in bowel habits, black or bloody stools. No urinary tract symptoms. GYN ROS: no vaginal bleeding, no discharge or pelvic pain. No neurological complaints. OBJECTIVE:   The patient appears well, alert, oriented x 3, in no distress. Visit Vitals    /75 (BP 1 Location: Left arm, BP Patient Position: Sitting)    Pulse 83    Temp 98.7 °F (37.1 °C) (Oral)    Resp 17    Ht 5' 6\" (1.676 m)    Wt 186 lb 9.6 oz (84.6 kg)    SpO2 96%    BMI 30.12 kg/m2     HEENT:ENT normal.  Neck supple. No adenopathy or thyromegaly. RAVI. Chest: Lungs are clear, good air entry, no wheezes, rhonchi or rales. Cardiovascular: S1 and S2 normal, no murmurs, regular rate and rhythm. Abdomen: soft without tenderness, guarding, mass or organomegaly. Unable to cause discomfort with exam and palpation. Extremities: show no edema, normal peripheral pulses. Neurological: is normal, no focal findings. Urine negative for all components. Assessment/Plan:       ICD-10-CM ICD-9-CM    1. Gastroenteritis K52.9 558.9    2.  Fever, unspecified fever cause R50.9 780.60 .Reviewed instructions given on diet, fluids and need for follow up if symptoms continue or worsen.

## 2018-05-24 ENCOUNTER — HOSPITAL ENCOUNTER (OUTPATIENT)
Dept: GENERAL RADIOLOGY | Age: 58
Discharge: HOME | End: 2018-05-24
Attending: INTERNAL MEDICINE
Payer: COMMERCIAL

## 2018-05-24 DIAGNOSIS — R10.32 LLQ ABDOMINAL PAIN: ICD-10-CM

## 2018-05-24 PROCEDURE — 77030032790 HC SOL CNTRST ORL POLBAR

## 2018-05-24 PROCEDURE — 74270 X-RAY XM COLON 1CNTRST STD: CPT

## 2022-03-28 ENCOUNTER — OFFICE VISIT (OUTPATIENT)
Dept: PRIMARY CARE CLINIC | Age: 62
End: 2022-03-28
Payer: COMMERCIAL

## 2022-03-28 VITALS
SYSTOLIC BLOOD PRESSURE: 118 MMHG | TEMPERATURE: 97.3 F | HEIGHT: 65 IN | DIASTOLIC BLOOD PRESSURE: 81 MMHG | WEIGHT: 185.6 LBS | HEART RATE: 103 BPM | RESPIRATION RATE: 18 BRPM | OXYGEN SATURATION: 98 % | BODY MASS INDEX: 30.92 KG/M2

## 2022-03-28 DIAGNOSIS — R30.0 DYSURIA: ICD-10-CM

## 2022-03-28 DIAGNOSIS — N30.91 CYSTITIS WITH HEMATURIA: Primary | ICD-10-CM

## 2022-03-28 LAB
BILIRUB UR QL STRIP: NEGATIVE
GLUCOSE UR-MCNC: NEGATIVE MG/DL
KETONES P FAST UR STRIP-MCNC: NEGATIVE MG/DL
PH UR STRIP: 6 [PH] (ref 4.6–8)
PROT UR QL STRIP: NORMAL
SP GR UR STRIP: 1.01 (ref 1–1.03)
UA UROBILINOGEN AMB POC: NORMAL (ref 0.2–1)
URINALYSIS CLARITY POC: NORMAL
URINALYSIS COLOR POC: YELLOW
URINE BLOOD POC: NORMAL
URINE LEUKOCYTES POC: NORMAL
URINE NITRITES POC: NEGATIVE

## 2022-03-28 PROCEDURE — 99213 OFFICE O/P EST LOW 20 MIN: CPT | Performed by: NURSE PRACTITIONER

## 2022-03-28 PROCEDURE — 81003 URINALYSIS AUTO W/O SCOPE: CPT | Performed by: NURSE PRACTITIONER

## 2022-03-28 RX ORDER — FEXOFENADINE HCL 60 MG
TABLET ORAL
COMMUNITY

## 2022-03-28 RX ORDER — BISMUTH SUBSALICYLATE 262 MG
1 TABLET,CHEWABLE ORAL DAILY
COMMUNITY

## 2022-03-28 RX ORDER — NITROFURANTOIN 25; 75 MG/1; MG/1
100 CAPSULE ORAL 2 TIMES DAILY
Qty: 10 CAPSULE | Refills: 0 | Status: SHIPPED | OUTPATIENT
Start: 2022-03-28 | End: 2022-04-02

## 2022-03-28 RX ORDER — MOMETASONE FUROATE 1 MG/G
CREAM TOPICAL
COMMUNITY
Start: 2021-05-11

## 2022-03-28 RX ORDER — PHENAZOPYRIDINE HYDROCHLORIDE 100 MG/1
100 TABLET, FILM COATED ORAL
Qty: 9 TABLET | Refills: 0 | Status: SHIPPED | OUTPATIENT
Start: 2022-03-28 | End: 2022-03-31

## 2022-03-28 NOTE — PROGRESS NOTES
HISTORY OF PRESENT ILLNESS  Jessi Garnett is a 64 y.o. female. Patient with a 1-2 hour history of frequency, urgency and hematuria. 5 pm. Fequency and saw blood. + pelvic pressure. No vaginal discharge or bleeding. N0 flank pain Hysterectomy several years ago. Past Medical History:   Diagnosis Date    Diverticulitis      Past Surgical History:   Procedure Laterality Date    HX CHOLECYSTECTOMY  0    HX GYN  2009    VICKI       Review of Systems   Constitutional: Negative for fever and malaise/fatigue. Gastrointestinal: Negative for abdominal pain, nausea and vomiting. Genitourinary: Positive for dysuria, frequency, hematuria and urgency. Negative for flank pain. Musculoskeletal: Negative for back pain and myalgias. Physical Exam  Vitals and nursing note reviewed. Constitutional:       General: She is not in acute distress. Appearance: Normal appearance. HENT:      Head: Atraumatic. Eyes:      Pupils: Pupils are equal, round, and reactive to light. Cardiovascular:      Rate and Rhythm: Normal rate. Pulses: Normal pulses. Pulmonary:      Effort: Pulmonary effort is normal.   Abdominal:      Tenderness: There is no right CVA tenderness or left CVA tenderness. Musculoskeletal:         General: Normal range of motion. Cervical back: Normal range of motion. Skin:     General: Skin is warm. Neurological:      General: No focal deficit present. Mental Status: She is alert.    Psychiatric:         Mood and Affect: Mood normal.       Results for orders placed or performed in visit on 03/28/22   AMB POC URINALYSIS DIP STICK AUTO W/O MICRO   Result Value Ref Range    Color (UA POC) Yellow     Clarity (UA POC) Cloudy     Glucose (UA POC) Negative Negative    Bilirubin (UA POC) Negative Negative    Ketones (UA POC) Negative Negative    Specific gravity (UA POC) 1.010 1.001 - 1.035    Blood (UA POC) 3+ Negative    pH (UA POC) 6.0 4.6 - 8.0    Protein (UA POC) 2+ Negative Urobilinogen (UA POC) 0.2 mg/dL 0.2 - 1    Nitrites (UA POC) Negative Negative    Leukocyte esterase (UA POC) 3+ Negative       ASSESSMENT and PLAN    ICD-10-CM ICD-9-CM    1. Cystitis with hematuria  N30.91 595.9    2. Dysuria  R30.0 788.1 AMB POC URINALYSIS DIP STICK AUTO W/O MICRO      CULTURE, URINE     Encounter Diagnoses   Name Primary?  Cystitis with hematuria Yes    Dysuria      Orders Placed This Encounter    CULTURE, URINE    AMB POC URINALYSIS DIP STICK AUTO W/O MICRO    nitrofurantoin, macrocrystal-monohydrate, (MACROBID) 100 mg capsule    phenazopyridine (PYRIDIUM) 100 mg tablet     Medications as directed  Take with food. Complete entire course of prescription. Follow plan of care as discussed. Urine culture sent. Will result in My Chart.   Signed By: Alcario Cooks, FNP     March 28, 2022

## 2022-03-28 NOTE — PATIENT INSTRUCTIONS
Urinary Tract Infection (UTI) in Women: Care Instructions  Overview     A urinary tract infection, or UTI, is a general term for an infection anywhere between the kidneys and the urethra (where urine comes out). Most UTIs are bladder infections. They often cause pain or burning when you urinate. UTIs are caused by bacteria and can be cured with antibiotics. Be sure to complete your treatment so that the infection does not get worse. Follow-up care is a key part of your treatment and safety. Be sure to make and go to all appointments, and call your doctor if you are having problems. It's also a good idea to know your test results and keep a list of the medicines you take. How can you care for yourself at home? · Take your antibiotics as directed. Do not stop taking them just because you feel better. You need to take the full course of antibiotics. · Drink extra water and other fluids for the next day or two. This will help make the urine less concentrated and help wash out the bacteria that are causing the infection. (If you have kidney, heart, or liver disease and have to limit fluids, talk with your doctor before you increase the amount of fluids you drink.)  · Avoid drinks that are carbonated or have caffeine. They can irritate the bladder. · Urinate often. Try to empty your bladder each time. · To relieve pain, take a hot bath or lay a heating pad set on low over your lower belly or genital area. Never go to sleep with a heating pad in place. To prevent UTIs  · Drink plenty of water each day. This helps you urinate often, which clears bacteria from your system. (If you have kidney, heart, or liver disease and have to limit fluids, talk with your doctor before you increase the amount of fluids you drink.)  · Urinate when you need to. · If you are sexually active, urinate right after you have sex. · Change sanitary pads often.   · Avoid douches, bubble baths, feminine hygiene sprays, and other feminine hygiene products that have deodorants. · After going to the bathroom, wipe from front to back. When should you call for help? Call your doctor now or seek immediate medical care if:    · Symptoms such as fever, chills, nausea, or vomiting get worse or appear for the first time.     · You have new pain in your back just below your rib cage. This is called flank pain.     · There is new blood or pus in your urine.     · You have any problems with your antibiotic medicine. Watch closely for changes in your health, and be sure to contact your doctor if:    · You are not getting better after taking an antibiotic for 2 days.     · Your symptoms go away but then come back. Where can you learn more? Go to http://www.gray.com/  Enter D935 in the search box to learn more about \"Urinary Tract Infection (UTI) in Women: Care Instructions. \"  Current as of: October 18, 2021               Content Version: 13.2  © 2006-2022 [employer]. Care instructions adapted under license by [employer] (which disclaims liability or warranty for this information). If you have questions about a medical condition or this instruction, always ask your healthcare professional. [place] [address] any warranty or liability for your use of this information.

## 2022-03-31 LAB
BACTERIA SPEC CULT: NORMAL
SERVICE CMNT-IMP: NORMAL

## 2023-05-25 RX ORDER — MOMETASONE FUROATE 1 MG/G
CREAM TOPICAL
COMMUNITY
Start: 2021-05-11

## 2023-05-25 RX ORDER — FEXOFENADINE HCL 60 MG/1
TABLET, FILM COATED ORAL
COMMUNITY

## 2025-06-13 ENCOUNTER — HOSPITAL ENCOUNTER (EMERGENCY)
Facility: HOSPITAL | Age: 65
Discharge: HOME | End: 2025-06-13
Attending: EMERGENCY MEDICINE
Payer: MEDICARE

## 2025-06-13 VITALS
SYSTOLIC BLOOD PRESSURE: 122 MMHG | TEMPERATURE: 97.9 F | OXYGEN SATURATION: 97 % | WEIGHT: 180 LBS | DIASTOLIC BLOOD PRESSURE: 89 MMHG | HEART RATE: 140 BPM | BODY MASS INDEX: 29.99 KG/M2 | RESPIRATION RATE: 16 BRPM | HEIGHT: 65 IN

## 2025-06-13 DIAGNOSIS — I48.91 ATRIAL FIBRILLATION, UNSPECIFIED TYPE (HCC): Primary | ICD-10-CM

## 2025-06-13 LAB
ALBUMIN SERPL-MCNC: 4.2 G/DL (ref 3.5–5)
ALBUMIN/GLOB SERPL: 1.3 (ref 1.1–2.2)
ALP SERPL-CCNC: 126 U/L (ref 45–117)
ALT SERPL-CCNC: 31 U/L (ref 12–78)
ANION GAP SERPL CALC-SCNC: 5 MMOL/L (ref 2–12)
AST SERPL-CCNC: 29 U/L (ref 15–37)
BASOPHILS # BLD: 0.09 K/UL (ref 0–0.1)
BASOPHILS NFR BLD: 1 % (ref 0–1)
BILIRUB SERPL-MCNC: 0.4 MG/DL (ref 0.2–1)
BUN SERPL-MCNC: 16 MG/DL (ref 6–20)
BUN/CREAT SERPL: 18 (ref 12–20)
CALCIUM SERPL-MCNC: 9.4 MG/DL (ref 8.5–10.1)
CHLORIDE SERPL-SCNC: 105 MMOL/L (ref 97–108)
CO2 SERPL-SCNC: 29 MMOL/L (ref 21–32)
COMMENT:: NORMAL
CREAT SERPL-MCNC: 0.87 MG/DL (ref 0.55–1.02)
DIFFERENTIAL METHOD BLD: NORMAL
EOSINOPHIL # BLD: 0.28 K/UL (ref 0–0.4)
EOSINOPHIL NFR BLD: 3 % (ref 0–7)
ERYTHROCYTE [DISTWIDTH] IN BLOOD BY AUTOMATED COUNT: 13.2 % (ref 11.5–14.5)
GLOBULIN SER CALC-MCNC: 3.2 G/DL (ref 2–4)
GLUCOSE SERPL-MCNC: 103 MG/DL (ref 65–100)
HCT VFR BLD AUTO: 42.6 % (ref 35–47)
HGB BLD-MCNC: 14.2 G/DL (ref 11.5–16)
IMM GRANULOCYTES # BLD AUTO: 0.03 K/UL (ref 0–0.04)
IMM GRANULOCYTES NFR BLD AUTO: 0.3 % (ref 0–0.5)
LYMPHOCYTES # BLD: 1.7 K/UL (ref 0.8–3.5)
LYMPHOCYTES NFR BLD: 18 % (ref 12–49)
MAGNESIUM SERPL-MCNC: 2.1 MG/DL (ref 1.6–2.4)
MCH RBC QN AUTO: 28.3 PG (ref 26–34)
MCHC RBC AUTO-ENTMCNC: 33.3 G/DL (ref 30–36.5)
MCV RBC AUTO: 84.9 FL (ref 80–99)
MONOCYTES # BLD: 0.68 K/UL (ref 0–1)
MONOCYTES NFR BLD: 7.2 % (ref 5–13)
NEUTS SEG # BLD: 6.66 K/UL (ref 1.8–8)
NEUTS SEG NFR BLD: 70.5 % (ref 32–75)
NRBC # BLD: 0 K/UL (ref 0–0.01)
NRBC BLD-RTO: 0 PER 100 WBC
PLATELET # BLD AUTO: 311 K/UL (ref 150–400)
PMV BLD AUTO: 10 FL (ref 8.9–12.9)
POTASSIUM SERPL-SCNC: 4.2 MMOL/L (ref 3.5–5.1)
PROT SERPL-MCNC: 7.4 G/DL (ref 6.4–8.2)
RBC # BLD AUTO: 5.02 M/UL (ref 3.8–5.2)
SODIUM SERPL-SCNC: 139 MMOL/L (ref 136–145)
SPECIMEN HOLD: NORMAL
TROPONIN I SERPL HS-MCNC: 4 NG/L (ref 0–51)
WBC # BLD AUTO: 9.4 K/UL (ref 3.6–11)

## 2025-06-13 PROCEDURE — 6370000000 HC RX 637 (ALT 250 FOR IP): Performed by: EMERGENCY MEDICINE

## 2025-06-13 PROCEDURE — 85025 COMPLETE CBC W/AUTO DIFF WBC: CPT

## 2025-06-13 PROCEDURE — 99284 EMERGENCY DEPT VISIT MOD MDM: CPT

## 2025-06-13 PROCEDURE — 96375 TX/PRO/DX INJ NEW DRUG ADDON: CPT

## 2025-06-13 PROCEDURE — 2500000003 HC RX 250 WO HCPCS: Performed by: EMERGENCY MEDICINE

## 2025-06-13 PROCEDURE — 96365 THER/PROPH/DIAG IV INF INIT: CPT

## 2025-06-13 PROCEDURE — 6360000002 HC RX W HCPCS: Performed by: EMERGENCY MEDICINE

## 2025-06-13 PROCEDURE — 83735 ASSAY OF MAGNESIUM: CPT

## 2025-06-13 PROCEDURE — 96366 THER/PROPH/DIAG IV INF ADDON: CPT

## 2025-06-13 PROCEDURE — 2580000003 HC RX 258: Performed by: EMERGENCY MEDICINE

## 2025-06-13 PROCEDURE — 36415 COLL VENOUS BLD VENIPUNCTURE: CPT

## 2025-06-13 PROCEDURE — 80053 COMPREHEN METABOLIC PANEL: CPT

## 2025-06-13 PROCEDURE — 84484 ASSAY OF TROPONIN QUANT: CPT

## 2025-06-13 PROCEDURE — 93005 ELECTROCARDIOGRAM TRACING: CPT | Performed by: EMERGENCY MEDICINE

## 2025-06-13 RX ORDER — MAGNESIUM SULFATE IN WATER 40 MG/ML
2000 INJECTION, SOLUTION INTRAVENOUS ONCE
Status: COMPLETED | OUTPATIENT
Start: 2025-06-13 | End: 2025-06-13

## 2025-06-13 RX ORDER — DILTIAZEM HYDROCHLORIDE 30 MG/1
30 TABLET, FILM COATED ORAL 4 TIMES DAILY
Qty: 120 TABLET | Refills: 0 | Status: SHIPPED | OUTPATIENT
Start: 2025-06-13 | End: 2025-07-13

## 2025-06-13 RX ORDER — 0.9 % SODIUM CHLORIDE 0.9 %
1000 INTRAVENOUS SOLUTION INTRAVENOUS ONCE
Status: COMPLETED | OUTPATIENT
Start: 2025-06-13 | End: 2025-06-13

## 2025-06-13 RX ORDER — DILTIAZEM HYDROCHLORIDE 30 MG/1
60 TABLET, FILM COATED ORAL
Status: COMPLETED | OUTPATIENT
Start: 2025-06-13 | End: 2025-06-13

## 2025-06-13 RX ORDER — DILTIAZEM HYDROCHLORIDE 180 MG/1
180 CAPSULE, COATED, EXTENDED RELEASE ORAL
Status: DISCONTINUED | OUTPATIENT
Start: 2025-06-13 | End: 2025-06-13

## 2025-06-13 RX ORDER — DILTIAZEM HYDROCHLORIDE 180 MG/1
180 CAPSULE, COATED, EXTENDED RELEASE ORAL DAILY
Qty: 30 CAPSULE | Refills: 0 | Status: SHIPPED | OUTPATIENT
Start: 2025-06-13 | End: 2025-06-13

## 2025-06-13 RX ORDER — DILTIAZEM HYDROCHLORIDE 5 MG/ML
10 INJECTION INTRAVENOUS
Status: COMPLETED | OUTPATIENT
Start: 2025-06-13 | End: 2025-06-13

## 2025-06-13 RX ADMIN — DILTIAZEM HYDROCHLORIDE 10 MG: 5 INJECTION, SOLUTION INTRAVENOUS at 14:07

## 2025-06-13 RX ADMIN — DILTIAZEM HYDROCHLORIDE 60 MG: 30 TABLET ORAL at 14:54

## 2025-06-13 RX ADMIN — SODIUM CHLORIDE 1000 ML: 0.9 INJECTION, SOLUTION INTRAVENOUS at 14:11

## 2025-06-13 RX ADMIN — MAGNESIUM SULFATE HEPTAHYDRATE 2000 MG: 40 INJECTION, SOLUTION INTRAVENOUS at 14:07

## 2025-06-13 ASSESSMENT — ENCOUNTER SYMPTOMS
SORE THROAT: 0
VOMITING: 0
COUGH: 0

## 2025-06-13 ASSESSMENT — PAIN SCALES - GENERAL: PAINLEVEL_OUTOF10: 0

## 2025-06-13 NOTE — ED PROVIDER NOTES
[facility] EMERGENCY DEPARTMENT  EMERGENCY DEPARTMENT ENCOUNTER      Pt Name: Sameera Ji  MRN: [id number]  Birthdate 1960  Date of evaluation: 6/13/2025  Provider: Pedro Luis Coker MD    CHIEF COMPLAINT       Chief Complaint   Patient presents with    Irregular Heart Beat         HISTORY OF PRESENT ILLNESS   (Location/Symptom, Timing/Onset, Context/Setting, Quality, Duration, Modifying Factors, Severity)  Note limiting factors.   65-year-old female presents from work with complaints of atrial fibrillation.  States was seen at her desk when she felt the symptoms start about 12:50 PM.  Patient describes a fluttering in her chest with a rapid heart rate.  Denies any chest pain, cough, shortness of breath.  Had similar symptoms in January and [facility] where [facility] was diagnosed with [facility]-fib.  She converted spontaneously at that time and is since followed up with [facility].  She was given prescriptions for metoprolol and Eliquis with instructions to start these medications if she went back into atrial fibrillation.  She states she has not taken these medications yet.  Denies any other history of heart disease.    The history is provided by the patient and medical records.         Review of External Medical Records:     Nursing Notes were reviewed.    REVIEW OF SYSTEMS    (2-9 systems for level 4, 10 or more for level 5)     Review of Systems   Constitutional:  Negative for fatigue.   HENT:  Negative for sore throat.    Eyes:  Negative for visual disturbance.   Respiratory:  Negative for cough.    Cardiovascular:  Positive for palpitations.   Gastrointestinal:  Negative for vomiting.   Genitourinary:  Negative for difficulty urinating.   Musculoskeletal:  Negative for myalgias.   Skin:  Negative for rash.   Neurological:  Negative for weakness.       Except as noted above the remainder of the review of systems was reviewed and negative.       PAST MEDICAL HISTORY     Past Medical History:  interval: normal ; ST/T wave: non-specific changes; Other findings: abnormal.  EKG interpreted by Josefina Badillo MD.  [CH]      ED Course User Index  [CH] Josefina Badillo MD           CONSULTS:  None    PROCEDURES:  Unless otherwise noted below, none     Procedures      FINAL IMPRESSION      1. Atrial fibrillation, unspecified type (HCC)          DISPOSITION/PLAN   DISPOSITION Discharge - Pending Orders Complete 06/13/2025 02:46:40 PM      PATIENT REFERRED TO:  Anuja Xavier, NOVA - NP  [address] [place] [id number] [phone]    Schedule an appointment as soon as possible for a visit       [facility] Emergency Department  [address] [address]  [phone]    If symptoms worsen      DISCHARGE MEDICATIONS:  New Prescriptions    DILTIAZEM (CARDIZEM) 30 MG IMMEDIATE RELEASE TABLET    Take 1 tablet by mouth 4 times daily         (Please note that portions of this note were completed with a voice recognition program.  Efforts were made to edit the dictations but occasionally words are mis-transcribed.)    Pedro Luis Coker MD (electronically signed)  Emergency Attending Physician / Physician Assistant / Nurse Practitioner             Pedro Luis Coker MD  06/13/25 [id number]

## 2025-06-13 NOTE — ED TRIAGE NOTES
Pt arrives with c/o being in afib RVR starting 30 min PTA.    PMH afib  Pt takes elequis and metoprolol, has not taken today.   Pt denies SOB, CP, n/v   Pt arrives in afib RVR rate of 160's.

## 2025-06-13 NOTE — DISCHARGE INSTR - COC
Continuity of Care Form    Patient Name: Sameera Ji   :  1960  MRN:  [id number]    Admit date:  2025  Discharge date:  ***    Code Status Order: No Order   Advance Directives:     Admitting Physician:  No admitting provider for patient encounter.  PCP: Scooter Marx MD    Discharging Nurse: ***  Discharging Hospital Unit/Room#: ER11/11  Discharging Unit Phone Number: ***    Emergency Contact:   Extended Emergency Contact Information  Primary Emergency Contact: Jesica Ji   [facility]  Home Phone: [phone]  Relation: Child    Past Surgical History:  Past Surgical History:   Procedure Laterality Date    CHOLECYSTECTOMY      GYN      [facility]       Immunization History:   Immunization History   Administered Date(s) Administered    COVID-19, MODERNA BLUE border, Primary or Immunocompromised, (age 12y+), IM, 100 mcg/0.5mL 2020, 2021, 2021    COVID-19, MODERNA Bivalent, (age 12y+), IM, 50 mcg/0.5 mL 10/26/2022    Influenza Virus Vaccine 10/30/2011       Active Problems:  Patient Active Problem List   Diagnosis Code    Allergic rhinitis due to pollen J30.1    Rosacea L71.9       Isolation/Infection:   Isolation            No Isolation          Patient Infection Status    None to display         Nurse Assessment:  Last Vital Signs: /89   Pulse (!) 140   Temp 97.9 °F (36.6 °C) (Oral)   Resp 16   Ht 1.651 m (5' 5\")   Wt 81.6 kg (180 lb)   SpO2 97%   BMI 29.95 kg/m²     Last documented pain score (0-10 scale): Pain Level: 0  Last Weight:   Wt Readings from Last 1 Encounters:   25 81.6 kg (180 lb)     Mental Status:  {IP PT MENTAL STATUS:}    IV Access:  { HERMANN IV ACCESS:962391924}    Nursing Mobility/ADLs:  Walking   {CHP DME ADLs:429338988}  Transfer  {CHP DME ADLs:771951754}  Bathing  {CHP DME ADLs:846024376}  Dressing  {CHP DME ADLs:495366932}  Toileting  {CHP DME ADLs:844340827}  Feeding  {CHP DME ADLs:605091969}  Med Admin  {ROSETTE SORENSON  ADLs:346530856}  Med Delivery   { HERMANN MED Delivery:601124721}    Wound Care Documentation and Therapy:        Elimination:  Continence:   Bowel: {YES / NO:}  Bladder: {YES / NO:}  Urinary Catheter: {Urinary Catheter:774208297}   Colostomy/Ileostomy/Ileal Conduit: {YES / NO:}       Date of Last BM: ***  No intake or output data in the 24 hours ending 25 1538  No intake/output data recorded.    Safety Concerns:     { HERMANN Safety Concerns:702352509}    Impairments/Disabilities:      {[facility] Impairments/Disabilities:668435889}    Nutrition Therapy:  Current Nutrition Therapy:   {[facility] Diet List:109197552}    Routes of Feeding: {[facility] Other Feedings:758678481}  Liquids: {Slp liquid thickness:32066}  Daily Fluid Restriction: {[facility] DME Yes amt example:363498485}  Last Modified Barium Swallow with Video (Video Swallowing Test): {Done Not Done Date:}    Treatments at the Time of Hospital Discharge:   Respiratory Treatments: ***  Oxygen Therapy:  {Therapy; copd oxygen:77459}  Ventilator:    {[facility] Vent List:841653521}    Rehab Therapies: {THERAPEUTIC INTERVENTION:9001183295}  Weight Bearing Status/Restrictions: {[facility] Weight Bearin}  Other Medical Equipment (for information only, NOT a DME order):  {EQUIPMENT:945789424}  Other Treatments: ***    Patient's personal belongings (please select all that are sent with patient):  {[facility] Belongings:972730626}    RN SIGNATURE:  {Esignature:053977658}    CASE MANAGEMENT/SOCIAL WORK SECTION    Inpatient Status Date: ***    Readmission Risk Assessment Score:  [facility] RISK OF UNPLANNED READMISSION 2.0             0 Total Score        Discharging to Facility/ Agency   Name:   Address:  Phone:  Fax:    Dialysis Facility (if applicable)   Name:  Address:  Dialysis Schedule:  Phone:  Fax:    / signature: {Esignature:617268229}    PHYSICIAN SECTION    Prognosis: {Prognosis:1744150808}    Condition at Discharge: { Patient

## 2025-06-14 LAB
EKG DIAGNOSIS: NORMAL
EKG Q-T INTERVAL: 284 MS
EKG QRS DURATION: 70 MS
EKG QTC CALCULATION (BAZETT): 467 MS
EKG R AXIS: 51 DEGREES
EKG T AXIS: 111 DEGREES
EKG VENTRICULAR RATE: 163 BPM

## 2025-06-14 PROCEDURE — 93010 ELECTROCARDIOGRAM REPORT: CPT | Performed by: INTERNAL MEDICINE

## 2025-07-02 ENCOUNTER — OFFICE VISIT (OUTPATIENT)
Age: 65
End: 2025-07-02

## 2025-07-02 VITALS
TEMPERATURE: 97.9 F | BODY MASS INDEX: 30.95 KG/M2 | SYSTOLIC BLOOD PRESSURE: 127 MMHG | HEART RATE: 67 BPM | WEIGHT: 186 LBS | DIASTOLIC BLOOD PRESSURE: 76 MMHG | OXYGEN SATURATION: 96 %

## 2025-07-02 DIAGNOSIS — T50.905A URTICARIA DUE TO DRUG ALLERGY: Primary | ICD-10-CM

## 2025-07-02 DIAGNOSIS — L50.0 URTICARIA DUE TO DRUG ALLERGY: Primary | ICD-10-CM

## 2025-07-02 RX ORDER — ATORVASTATIN CALCIUM 10 MG/1
10 TABLET, FILM COATED ORAL DAILY
COMMUNITY

## 2025-07-02 RX ORDER — METOPROLOL SUCCINATE 25 MG/1
25 TABLET, EXTENDED RELEASE ORAL DAILY
COMMUNITY

## 2025-07-02 RX ORDER — FAMOTIDINE 20 MG/1
20 TABLET, FILM COATED ORAL 2 TIMES DAILY
COMMUNITY

## 2025-07-02 RX ORDER — METHYLPREDNISOLONE 4 MG/1
TABLET ORAL
Qty: 1 KIT | Refills: 0 | Status: SHIPPED | OUTPATIENT
Start: 2025-07-02

## 2025-07-02 ASSESSMENT — ENCOUNTER SYMPTOMS
SHORTNESS OF BREATH: 0
STRIDOR: 0
FACIAL SWELLING: 0
WHEEZING: 0

## 2025-07-02 NOTE — PROGRESS NOTES
2025   Sameera Ji (: 1960) is a 65 y.o. female, New patient, here for evaluation of the following chief complaint(s):  Rash (Rash all over body since last Thursday)       ASSESSMENT/PLAN:  Below is the assessment and plan developed based on review of pertinent history, physical exam, labs, studies, and medications.  1. Urticaria due to drug allergy     - Medrol dose aishwarya    Possible allergic rash secondary to Cardizem vs Eliquis.  Has stopped both.  Has contacted cardiology and dermatology.  Already taking H1 and H2 blockers.  Denies SOB, facial or throat swelling.      Handout given with care instructions  Pt with stable VS, non-toxic appearing  Pt in no acute distress and afebrile   4.   OTC for symptom management. Increase fluid intake, ensure adequate nutritional intake.  5.   Follow up with PCP as needed.  6.   Go to ED with development of any acute symptoms.     Follow up:  Return if symptoms worsen or fail to improve.  Follow up immediately for any new, worsening or changes or if symptoms are not improving over the next 5-7 days.     SUBJECTIVE/OBJECTIVE:  HPI       Rash (Rash all over body since last Thursday)    Recently started on Cardizem and Eliquis for afib with RVR.  Rash not improving with histamine blocking.          Review of Systems   Constitutional:  Negative for fatigue and fever.   HENT:  Negative for facial swelling.    Respiratory:  Negative for shortness of breath, wheezing and stridor.    Skin:  Positive for rash.         Physical Exam  Constitutional:       Appearance: Normal appearance.   Cardiovascular:      Rate and Rhythm: Normal rate and regular rhythm.      Pulses: Normal pulses.      Heart sounds: Normal heart sounds.   Pulmonary:      Effort: Pulmonary effort is normal.      Breath sounds: Normal breath sounds.   Lymphadenopathy:      Cervical: No cervical adenopathy.   Skin:     Findings: Rash present. Rash is urticarial.      Comments: Diffuse urticarial rash

## 2025-07-02 NOTE — PATIENT INSTRUCTIONS
-  Zyrtec/Claritin/Benadryl - for itching/ inflammation  -  Pepcid (H2 Blocker) for itching/inflammation  -  cool compresses/soaks    A survey will be sent shortly to your phone/email.  Please complete this so we may know how to better serve you in the future.     Thank you for choosing [facility].  I hope you feel better soon!